# Patient Record
Sex: FEMALE | Race: WHITE | NOT HISPANIC OR LATINO | URBAN - METROPOLITAN AREA
[De-identification: names, ages, dates, MRNs, and addresses within clinical notes are randomized per-mention and may not be internally consistent; named-entity substitution may affect disease eponyms.]

---

## 2017-11-21 ENCOUNTER — APPOINTMENT (RX ONLY)
Dept: URBAN - METROPOLITAN AREA CLINIC 31 | Facility: CLINIC | Age: 37
Setting detail: DERMATOLOGY
End: 2017-11-21

## 2017-11-21 DIAGNOSIS — D18.0 HEMANGIOMA: ICD-10-CM

## 2017-11-21 DIAGNOSIS — D22 MELANOCYTIC NEVI: ICD-10-CM

## 2017-11-21 DIAGNOSIS — L81.1 CHLOASMA: ICD-10-CM

## 2017-11-21 DIAGNOSIS — L81.4 OTHER MELANIN HYPERPIGMENTATION: ICD-10-CM

## 2017-11-21 PROBLEM — D22.9 MELANOCYTIC NEVI, UNSPECIFIED: Status: ACTIVE | Noted: 2017-11-21

## 2017-11-21 PROBLEM — D18.01 HEMANGIOMA OF SKIN AND SUBCUTANEOUS TISSUE: Status: ACTIVE | Noted: 2017-11-21

## 2017-11-21 PROBLEM — D23.5 OTHER BENIGN NEOPLASM OF SKIN OF TRUNK: Status: ACTIVE | Noted: 2017-11-21

## 2017-11-21 PROBLEM — D23.72 OTHER BENIGN NEOPLASM OF SKIN OF LEFT LOWER LIMB, INCLUDING HIP: Status: ACTIVE | Noted: 2017-11-21

## 2017-11-21 PROBLEM — J45.909 UNSPECIFIED ASTHMA, UNCOMPLICATED: Status: ACTIVE | Noted: 2017-11-21

## 2017-11-21 PROBLEM — D22.5 MELANOCYTIC NEVI OF TRUNK: Status: ACTIVE | Noted: 2017-11-21

## 2017-11-21 PROBLEM — D22.61 MELANOCYTIC NEVI OF RIGHT UPPER LIMB, INCLUDING SHOULDER: Status: ACTIVE | Noted: 2017-11-21

## 2017-11-21 PROCEDURE — ? OBSERVATION AND MEASURE

## 2017-11-21 PROCEDURE — 99203 OFFICE O/P NEW LOW 30 MIN: CPT

## 2017-11-21 PROCEDURE — ? COUNSELING

## 2017-11-21 ASSESSMENT — LOCATION SIMPLE DESCRIPTION DERM
LOCATION SIMPLE: RIGHT THUMB
LOCATION SIMPLE: INFERIOR FOREHEAD
LOCATION SIMPLE: RIGHT BREAST
LOCATION SIMPLE: RIGHT UPPER BACK

## 2017-11-21 ASSESSMENT — LOCATION DETAILED DESCRIPTION DERM
LOCATION DETAILED: RIGHT MEDIAL UPPER BACK
LOCATION DETAILED: DORSAL INTERPHALANGEAL JOINT RIGHT THUMB
LOCATION DETAILED: INFERIOR MID FOREHEAD
LOCATION DETAILED: RIGHT MEDIAL BREAST 3-4:00 REGION

## 2017-11-21 ASSESSMENT — LOCATION ZONE DERM
LOCATION ZONE: FINGER
LOCATION ZONE: FACE
LOCATION ZONE: TRUNK

## 2017-11-21 NOTE — HPI: BUMPS
Have Your Bumps Been Treated?: not been treated
Is This A New Presentation, Or A Follow-Up?: Bumps
Additional History: They are not changing.

## 2017-11-21 NOTE — HPI: FULL BODY SKIN EXAMINATION
What Is The Reason For Today's Visit?: Full Body Skin Examination
What Is The Reason For Today's Visit? (Being Monitored For X): concerning skin lesions on an annual basis
Additional History: She has not noticed any tender or bleeding spots.

## 2019-10-09 ENCOUNTER — APPOINTMENT (RX ONLY)
Dept: URBAN - METROPOLITAN AREA CLINIC 31 | Facility: CLINIC | Age: 39
Setting detail: DERMATOLOGY
End: 2019-10-09

## 2019-10-09 DIAGNOSIS — L20.89 OTHER ATOPIC DERMATITIS: ICD-10-CM

## 2019-10-09 PROBLEM — L20.84 INTRINSIC (ALLERGIC) ECZEMA: Status: ACTIVE | Noted: 2019-10-09

## 2019-10-09 PROCEDURE — ? COUNSELING

## 2019-10-09 PROCEDURE — ? PRESCRIPTION

## 2019-10-09 PROCEDURE — ? TREATMENT REGIMEN

## 2019-10-09 PROCEDURE — 99213 OFFICE O/P EST LOW 20 MIN: CPT

## 2019-10-09 RX ORDER — BETAMETHASONE DIPROPIONATE 0.5 MG/G
OINTMENT TOPICAL BID
Qty: 1 | Refills: 3 | Status: ERX | COMMUNITY
Start: 2019-10-09

## 2019-10-09 RX ADMIN — BETAMETHASONE DIPROPIONATE: 0.5 OINTMENT TOPICAL at 17:43

## 2019-10-09 ASSESSMENT — LOCATION DETAILED DESCRIPTION DERM
LOCATION DETAILED: RIGHT PROXIMAL PRETIBIAL REGION
LOCATION DETAILED: RIGHT DISTAL PRETIBIAL REGION
LOCATION DETAILED: RIGHT DISTAL CALF

## 2019-10-09 ASSESSMENT — LOCATION SIMPLE DESCRIPTION DERM
LOCATION SIMPLE: RIGHT CALF
LOCATION SIMPLE: RIGHT PRETIBIAL REGION

## 2019-10-09 ASSESSMENT — LOCATION ZONE DERM: LOCATION ZONE: LEG

## 2019-10-09 NOTE — PROCEDURE: TREATMENT REGIMEN
Detail Level: Zone
Initiate Treatment: Betamethasone ointment BID with wet wraps at least daily x 2-3 weeks.\\nWet wrap instructions reviewed and handout given.\\nApply CeraVe Cream or VaniCream BID. \\nAvoid long, hot showers and use gentle cleanser or no soap on affected area when bathing. \\nF/u in 4 wks. Pt counseled on possible Bx at next visit if sx not resolving.

## 2019-11-18 ENCOUNTER — APPOINTMENT (RX ONLY)
Dept: URBAN - METROPOLITAN AREA CLINIC 31 | Facility: CLINIC | Age: 39
Setting detail: DERMATOLOGY
End: 2019-11-18

## 2019-11-18 DIAGNOSIS — L20.89 OTHER ATOPIC DERMATITIS: ICD-10-CM | Status: IMPROVED

## 2019-11-18 PROCEDURE — ? PRESCRIPTION

## 2019-11-18 PROCEDURE — ? COUNSELING

## 2019-11-18 PROCEDURE — ? TREATMENT REGIMEN

## 2019-11-18 PROCEDURE — 99213 OFFICE O/P EST LOW 20 MIN: CPT

## 2019-11-18 RX ORDER — CRISABOROLE 20 MG/G
OINTMENT TOPICAL BID
Qty: 1 | Refills: 4 | Status: ERX | COMMUNITY
Start: 2019-11-18

## 2019-11-18 RX ADMIN — CRISABOROLE: 20 OINTMENT TOPICAL at 00:00

## 2019-11-18 ASSESSMENT — LOCATION DETAILED DESCRIPTION DERM
LOCATION DETAILED: RIGHT DISTAL CALF
LOCATION DETAILED: RIGHT PROXIMAL PRETIBIAL REGION
LOCATION DETAILED: RIGHT DISTAL PRETIBIAL REGION

## 2019-11-18 ASSESSMENT — LOCATION ZONE DERM: LOCATION ZONE: LEG

## 2019-11-18 ASSESSMENT — LOCATION SIMPLE DESCRIPTION DERM
LOCATION SIMPLE: RIGHT CALF
LOCATION SIMPLE: RIGHT PRETIBIAL REGION

## 2019-11-18 NOTE — PROCEDURE: TREATMENT REGIMEN
Detail Level: Zone
Initiate Treatment: Eucrisa BID. Samples given.\\nPt may switch to betamethasone for up to 2 wks as needed in event of breakthrough flares. \\nDiscussed importance of consistent BID moisturizer use. Pt verbalizes understanding. \\nF/u in 6 mos. Sooner in event of any persistent/worsening sx.

## 2020-12-04 PROBLEM — U07.1 REAL TIME REVERSE TRANSCRIPTASE PCR POSITIVE FOR COVID-19 VIRUS: Status: ACTIVE | Noted: 2020-12-04

## 2021-09-25 ENCOUNTER — HOSPITAL ENCOUNTER (OUTPATIENT)
Dept: RADIOLOGY | Facility: MEDICAL CENTER | Age: 41
End: 2021-09-25

## 2021-09-25 PROBLEM — Z86.69 HX OF MIGRAINES: Status: ACTIVE | Noted: 2021-09-25

## 2021-09-25 PROBLEM — G08 ACUTE CEREBRAL VENOUS SINUS THROMBOSIS: Status: ACTIVE | Noted: 2021-09-25

## 2021-09-25 PROBLEM — R47.1 DYSARTHRIA: Status: ACTIVE | Noted: 2021-09-25

## 2021-12-02 NOTE — PROGRESS NOTES
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 She did have right sided headaches and dysarthria. (She is right handed).  She was discharged 9/29/21 on Xarelto and then changed to Eliquis. Protein S, C, Factor V Leiden and anticardiolipin abs were negative but drawn after anticoagulated.. Prothrombin 47881B and antithrombin III were not tested. She has a strong FH of strokes - father and aunt. She was on BC pills at the time of the thrombosis. Followed by neuro in [place]. Has chronic migraine and is on a multitude of migraine medications.    ROS:    Constitutional: No weight loss  Skin: No rash or jaundice  HENT: No change in eyesight or hearing  Cardiovascular:No chest pain or arrythmia  Respiratory:No cough or SOB  GI:No nausea, vomiting, diarrhea, constipation  :No dysuria or frequency  Musculoskeletal:No bone or joint pain  Neuro:See PI  Psych: No complaints. Does suffer from depression    PMH:      Allergies   Allergen Reactions   • Gluten Meal Unspecified     Gluten free diet    • Penicillin G Unspecified   • Sulfa Drugs    • Sulfamethoxazole-Trimethoprim Hives     Diffuse rash, fever, hepatitis, pharyngitis       Past Medical History:   Diagnosis Date   • Allergy    • Arthritis     mod c3-c5   • Asthma    • Back injuries 2000    L3-5 & S1 fracture    • Bronchitis    • Chronic sinusitis    • GERD (gastroesophageal reflux disease)    • Idiopathic angioedema     12 years ago   • Migraine    • Neck fracture (HCC) 2000    C1, C2   • Pneumonia    • Premature ovarian failure     premature ovarian failure   • SJS-TEN overlap syndrome ([facility])         Past Surgical History:   Procedure Laterality Date   • OTHER      gum graft surgery   • TENDON REPAIR Right     release of right  wrist   • TONSILLECTOMY     • TRIGGER FINGER RELEASE Right     thumb        Medications:    Current Outpatient Medications on File Prior to Visit   Medication Sig Dispense Refill   • Bacillus Coagulans-Inulin (PROBIOTIC FORMULA) 1-250 BILLION-MG Cap      • MULTIPLE VITAMINS-MINERALS ER PO Take  by mouth.     • Ascorbic Acid Crystals Take  by mouth.     • NS SOLN 60 mL with albuterol 2.5 mg/0.5 mL NEBU 10 mL      • vitamin D3 (CHOLECALCIFEROL) 5000 Unit (125 mcg) Tab Take 5,000 Units by mouth.     • ondansetron (ZOFRAN ODT) 4 MG TABLET DISPERSIBLE Take 4 mg by mouth every day.     • venlafaxine (EFFEXOR) 75 MG Tab Take 75 mg by mouth every day.     • Ubrogepant (UBRELVY) 100 MG Tab      • tizanidine (ZANAFLEX) 4 MG Tab Take 4 mg by mouth.     • promethazine (PHENERGAN) 25 MG Tab Take 25 mg by mouth.     • Tiotropium Bromide-Olodaterol (STIOLTO RESPIMAT) 2.5-2.5 MCG/ACT Aero Soln INHALE 2 PUFFS BY MOUTH EVERY 24 HOURS     • OXcarbazepine (TRILEPTAL) 300 MG Tab Take 300 mg by mouth.     • omeprazole (PRILOSEC) 40 MG delayed-release capsule 1 cap(s)     • clonazePAM (KLONOPIN) 0.5 MG Tab Take 0.25 mg by mouth.     • apixaban (ELIQUIS) 5mg Tab Take 1 Tablet by mouth 2 times a day.     • metoclopramide (REGLAN) 10 MG Tab Take 1 Tablet by mouth 2 times a day as needed (headaches and nausea). 60 Tablet 0   • Rimegepant Sulfate (NURTEC) 75 MG TABLET DISPERSIBLE Take 75 mg by mouth.     • ipratropium-albuterol (COMBIVENT RESPIMAT)  MCG/ACT Aero Soln Inhale 1 Puff 4 times a day as needed.     • Cholecalciferol (VITAMIN D3) 2000 UNIT Cap Take 5,000 Units by mouth every day.     • acetaminophen (TYLENOL) 500 MG Tab Take 1,000 mg by mouth every 6 hours as needed.     • Levocetirizine Dihydrochloride (XYZAL ALLERGY 24HR PO) Take 1 Tablet by mouth.     • Beclomethasone Diprop HFA (QVAR REDIHALER INH) Inhale.     • Cholecalciferol (VITAMIN D3) 2000 UNIT Cap 1 tab(s) (Patient not taking: Reported on 12/10/2021)     • Omega-3  Fatty Acids (FISH OIL) 1000 MG Cap capsule  (Patient not taking: Reported on 12/10/2021)     • Calcium Carb-Cholecalciferol (CALCIUM 1000 + D) 1000-800 MG-UNIT Tab  (Patient not taking: Reported on 12/10/2021)     • beclomethasone HFA (QVAR REDIHALER) 80 MCG/ACT inhaler Inhale 1 Puff 2 times a day. (Patient not taking: Reported on 12/10/2021)     • ipratropium-albuterol (COMBIVENT RESPIMAT)  MCG/ACT Aero Soln INHALE 2 PUFFS BY MOUTH EVERY 6 HOURS AS NEEDED *MAX 4 PUFFS IN 24 HOURS* (Patient not taking: Reported on 12/10/2021)     • Rimegepant Sulfate (NURTEC) 75 MG TABLET DISPERSIBLE Take 75 mg by mouth. (Patient not taking: Reported on 12/10/2021)     • omalizumab (XOLAIR) 150 MG as directed (Patient not taking: Reported on 12/10/2021)     • Fluticasone-Umeclidin-Vilant (TRELEGY ELLIPTA) 100-62.5-25 MCG/INH AEROSOL POWDER, BREATH ACTIVATED inhalation 1 puff(s) (Patient not taking: Reported on 12/10/2021)     • Azelastine-Fluticasone (DYMISTA) 137-50 MCG/ACT Suspension 1 spray(s) (Patient not taking: Reported on 12/10/2021)     • topiramate (TOPAMAX) 50 MG tablet Take 1 Tablet by mouth 2 times a day. (Patient not taking: Reported on 12/10/2021) 6 Tablet 0   • carBAMazepine (TEGRETOL) 200 MG Tab Take 1 Tablet by mouth 2 times a day. (Patient not taking: Reported on 12/10/2021) 60 Tablet 0   • rivaroxaban (XARELTO) 15 MG Tab tablet Take 1 Tablet by mouth 2 times a day. (Patient not taking: Reported on 12/10/2021) 42 Tablet 0   • pantoprazole (PROTONIX) 40 MG Tablet Delayed Response Take 1 Tablet by mouth every day. (Patient not taking: Reported on 12/10/2021) 30 Tablet 0   • diclofenac DR (VOLTAREN) 25 MG Tablet Delayed Response Take 25 mg by mouth 2 times a day. (Patient not taking: Reported on 12/10/2021)     • ELURYNG 0.12-0.015 MG/24HR vaginal ring Insert 1 Each into the vagina every 4 weeks. (Patient not taking: Reported on 12/10/2021)     • ondansetron (ZOFRAN ODT) 8 MG TABLET DISPERSIBLE Take 8 mg by  "mouth every 8 hours as needed for Nausea. (Patient not taking: Reported on 12/10/2021)     • pantoprazole (PROTONIX) 40 MG Tablet Delayed Response Take 40 mg by mouth every day. (Patient not taking: Reported on 12/10/2021)       No current facility-administered medications on file prior to visit.       Social History     Tobacco Use   • Smoking status: Never Smoker   • Smokeless tobacco: Never Used   Substance Use Topics   • Alcohol use: Yes     Comment: occasional        History reviewed. No pertinent family history.     Objective    Vitals:    /64   Pulse 82   Temp 36.2 °C (97.2 °F) (Temporal)   Resp 14   Ht 1.664 m (5' 5.5\")   Wt 72.6 kg (160 lb)   SpO2 97%   BMI 26.22 kg/m²     Physical Exam:    Appears well-developed and well-nourished. No distress.    Head -  Normocephalic .   Eyes - Pupils are equal.. Conjunctivae  normal. No scleral icterus.   Ears - normal hearing  Neurological -   Alert and oriented.  Skin -  No rash noted. Not diaphoretic. No erythema. No pallor. No jaundice   Psychiatric -  Normal mood and affect.    Labs:  Results for JOSE AGUIRRE (MRN [id number]) as of 12/2/2021 15:28   Ref. Range 9/28/2021 12:42   WBC Latest Ref Range: 4.8 - 10.8 K/uL 7.8   RBC Latest Ref Range: 4.20 - 5.40 M/uL 4.31   Hemoglobin Latest Ref Range: 13.0 - 17.0 g/dL 14.7   Hematocrit Latest Ref Range: 39.0 - 50.0 % 42.3   MCV Latest Ref Range: 81.0 - 99.0 fL 98.1   MCH Latest Ref Range: 27.0 - 31.0 pg 34.1 (H)   MCHC Latest Ref Range: 33.0 - 37.0 g/dL 34.8   RDW Latest Ref Range: 11.5 - 14.5 % 11.7   Platelet Count Latest Ref Range: 130 - 400 K/uL 272   Results for JOSE AGUIRRE (MRN [id number]) a   Ref. Range 9/27/2021 07:15   Protein C Activity Latest Ref Range: 70 - 180 % normal 121   Protein S Activity Latest Ref Range: 60 - 140 % normal 63   Factor V Leiden Mutation Unknown NEGATIVE   Results for JOSE AGUIRRE (MRN [id number])    Ref. Range 9/27/2021 07:15   Cardiolipin IgA Ab Latest Units: APL-U/mL 2.7 "   Cardiolipin IgG Ab Latest Units: GPL-U/mL <2.0   Cardiolipin IgM Ab Latest Units: MPL-U/mL <2.0       Assessment  Sounds like the thrombosis was provoked - vaccine + BC pills. Need to complete the thrombophilia w/u after 3 months of blood thinners. If negative can stay off Eliquis. If positive - chronic anticoagulation  Imp:    Visit Diagnosis:    1. Acute cerebral venous sinus thrombosis  FACTOR II DNA ANALYSIS    PROTEIN C PANEL    PROTEIN S FUNCTIONAL    ANTITHROMBIN PANEL (ARBILL)         Plan:    DC Eliquis after the first of the year and get the above lab 1 week later  Virtual visit a week after lab drawn    Brandon Orellana M.D.

## 2021-12-10 ENCOUNTER — OFFICE VISIT (OUTPATIENT)
Dept: HEMATOLOGY ONCOLOGY | Facility: MEDICAL CENTER | Age: 41
End: 2021-12-10
Payer: COMMERCIAL

## 2021-12-10 VITALS
OXYGEN SATURATION: 97 % | DIASTOLIC BLOOD PRESSURE: 64 MMHG | WEIGHT: 160 LBS | BODY MASS INDEX: 25.71 KG/M2 | RESPIRATION RATE: 14 BRPM | TEMPERATURE: 97.2 F | SYSTOLIC BLOOD PRESSURE: 118 MMHG | HEART RATE: 82 BPM | HEIGHT: 66 IN

## 2021-12-10 DIAGNOSIS — G08 ACUTE CEREBRAL VENOUS SINUS THROMBOSIS: ICD-10-CM

## 2021-12-10 PROCEDURE — 99204 OFFICE O/P NEW MOD 45 MIN: CPT | Performed by: INTERNAL MEDICINE

## 2021-12-10 RX ORDER — BACITRACIN ZINC AND POLYMYXIN B SULFATE 500; 10000 [USP'U]/G; [USP'U]/G
OINTMENT TOPICAL
COMMUNITY

## 2021-12-10 RX ORDER — IPRATROPIUM BROMIDE AND ALBUTEROL 20; 100 UG/1; UG/1
SPRAY, METERED RESPIRATORY (INHALATION)
COMMUNITY

## 2021-12-10 RX ORDER — TIOTROPIUM BROMIDE AND OLODATEROL 3.124; 2.736 UG/1; UG/1
SPRAY, METERED RESPIRATORY (INHALATION)
COMMUNITY

## 2021-12-10 RX ORDER — CLONAZEPAM 0.5 MG/1
0.25 TABLET ORAL
COMMUNITY
Start: 2021-11-16

## 2021-12-10 RX ORDER — PROMETHAZINE HYDROCHLORIDE 25 MG/1
25 TABLET ORAL
COMMUNITY
Start: 2021-09-30

## 2021-12-10 RX ORDER — RIMEGEPANT SULFATE 75 MG/75MG
75 TABLET, ORALLY DISINTEGRATING ORAL
COMMUNITY
Start: 2021-10-19

## 2021-12-10 RX ORDER — ONDANSETRON 4 MG/1
4 TABLET, ORALLY DISINTEGRATING ORAL DAILY
COMMUNITY
Start: 2021-12-08 | End: 2021-12-22

## 2021-12-10 RX ORDER — BECLOMETHASONE DIPROPIONATE HFA 80 UG/1
1 AEROSOL, METERED RESPIRATORY (INHALATION) 2 TIMES DAILY
COMMUNITY

## 2021-12-10 RX ORDER — VENLAFAXINE 75 MG/1
100 TABLET ORAL DAILY
COMMUNITY
Start: 2021-11-16

## 2021-12-10 RX ORDER — ACETAMINOPHEN 160 MG
TABLET,DISINTEGRATING ORAL
COMMUNITY
End: 2022-06-25

## 2021-12-10 RX ORDER — UBROGEPANT 100 MG/1
TABLET ORAL
COMMUNITY
Start: 2021-09-24

## 2021-12-10 RX ORDER — OMALIZUMAB 202.5 MG/1.4ML
INJECTION, SOLUTION SUBCUTANEOUS
COMMUNITY
End: 2022-06-25

## 2021-12-10 RX ORDER — OMEPRAZOLE 40 MG/1
CAPSULE, DELAYED RELEASE ORAL
COMMUNITY

## 2021-12-10 RX ORDER — OXCARBAZEPINE 300 MG/1
300 TABLET, FILM COATED ORAL
COMMUNITY
Start: 2021-10-05 | End: 2022-06-25

## 2021-12-10 RX ORDER — TIZANIDINE 4 MG/1
4 TABLET ORAL
COMMUNITY
Start: 2021-09-30 | End: 2022-06-25

## 2021-12-10 RX ORDER — AZELASTINE HYDROCHLORIDE, FLUTICASONE PROPIONATE 137; 50 UG/1; UG/1
SPRAY, METERED NASAL
COMMUNITY
End: 2022-06-25

## 2021-12-10 RX ORDER — CHLORAL HYDRATE 500 MG
CAPSULE ORAL
COMMUNITY
End: 2022-06-25

## 2021-12-10 ASSESSMENT — PATIENT HEALTH QUESTIONNAIRE - PHQ9
SUM OF ALL RESPONSES TO PHQ QUESTIONS 1-9: 11
5. POOR APPETITE OR OVEREATING: 3 - NEARLY EVERY DAY
CLINICAL INTERPRETATION OF PHQ2 SCORE: 2

## 2021-12-10 ASSESSMENT — FIBROSIS 4 INDEX: FIB4 SCORE: 0.65

## 2022-01-12 NOTE — PROGRESS NOTES
This evaluation was conducted via Zoom using secure and encrypted videoconferencing technology. The patient was in a private location in the state of [place].    The patient's identity was confirmed and verbal consent was obtained for this virtual visit.    01/18/22    Subjective    Chief Complaint:  Follow up from consult on 12/20/21 for right transverse sinus thrombosis.     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 Factor V Leiden is negative. Prothrombin gene mutation was [place] [facility] [place] did a Factor II level which was normal. Protein C activity was low at 30 - it was normal last September while on anticoagulants. Very srong FH of clots. Father had a stroke, aunt and uncle both had clotting issues - strokes and PE's.     ROS:    Constitutional: No weight loss  Skin: No rash or jaundice  HENT: No change in eyesight or hearing  Cardiovascular:No chest pain or arrythmia  Respiratory:No cough or SOB  GI:No nausea, vomiting, diarrhea, constipation  :No dysuria or frequency  Musculoskeletal:No bone or joint pain  Neuro:No sx's of neuropathy  Psych: No complaints    PMH:      Allergies   Allergen Reactions   • Gluten Meal Unspecified     Gluten free diet    • Penicillin G Unspecified   • Sulfa Drugs    • Sulfamethoxazole-Trimethoprim Hives     Diffuse rash, fever, hepatitis, pharyngitis       Past Medical History:   Diagnosis Date   • Allergy    • Arthritis     mod c3-c5   • Asthma    • Back injuries 2000    L3-5 & S1 fracture    • Bronchitis    • Chronic sinusitis    • GERD (gastroesophageal reflux disease)    • Idiopathic angioedema     12 years ago   • Migraine    • Neck fracture (HCC) 2000    C1, C2   • Pneumonia    • Premature ovarian failure     premature ovarian failure   • SJS-TEN overlap syndrome ([facility])         Past Surgical History:   Procedure Laterality Date   • OTHER      gum graft surgery   • TENDON REPAIR Right      release of right wrist   • TONSILLECTOMY     • TRIGGER FINGER RELEASE Right     thumb        Medications:    Current Outpatient Medications on File Prior to Visit   Medication Sig Dispense Refill   • Cholecalciferol (VITAMIN D3) 2000 UNIT Cap 1 tab(s)     • Bacillus Coagulans-Inulin (PROBIOTIC FORMULA) 1-250 BILLION-MG Cap      • MULTIPLE VITAMINS-MINERALS ER PO Take  by mouth.     • Omega-3 Fatty Acids (FISH OIL) 1000 MG Cap capsule      • Calcium Carb-Cholecalciferol (CALCIUM 1000 + D) 1000-800 MG-UNIT Tab      • Ascorbic Acid Crystals Take  by mouth.     • NS SOLN 60 mL with albuterol 2.5 mg/0.5 mL NEBU 10 mL      • beclomethasone HFA (QVAR REDIHALER) 80 MCG/ACT inhaler Inhale 1 Puff 2 times a day.     • vitamin D3 (CHOLECALCIFEROL) 5000 Unit (125 mcg) Tab Take 5,000 Units by mouth.     • ipratropium-albuterol (COMBIVENT RESPIMAT)  MCG/ACT Aero Soln INHALE 2 PUFFS BY MOUTH EVERY 6 HOURS AS NEEDED *MAX 4 PUFFS IN 24 HOURS*     • Rimegepant Sulfate (NURTEC) 75 MG TABLET DISPERSIBLE Take 75 mg by mouth.     • venlafaxine (EFFEXOR) 75 MG Tab Take 75 mg by mouth every day.     • Ubrogepant (UBRELVY) 100 MG Tab      • tizanidine (ZANAFLEX) 4 MG Tab Take 4 mg by mouth.     • promethazine (PHENERGAN) 25 MG Tab Take 25 mg by mouth.     • Tiotropium Bromide-Olodaterol (STIOLTO RESPIMAT) 2.5-2.5 MCG/ACT Aero Soln INHALE 2 PUFFS BY MOUTH EVERY 24 HOURS     • OXcarbazepine (TRILEPTAL) 300 MG Tab Take 300 mg by mouth.     • omeprazole (PRILOSEC) 40 MG delayed-release capsule 1 cap(s)     • omalizumab (XOLAIR) 150 MG as directed     • Fluticasone-Umeclidin-Vilant (TRELEGY ELLIPTA) 100-62.5-25 MCG/INH AEROSOL POWDER, BREATH ACTIVATED inhalation 1 puff(s)     • clonazePAM (KLONOPIN) 0.5 MG Tab Take 0.25 mg by mouth.     • Azelastine-Fluticasone (DYMISTA) 137-50 MCG/ACT Suspension 1 spray(s)     • apixaban (ELIQUIS) 5mg Tab Take 1 Tablet by mouth 2 times a day.     • metoclopramide (REGLAN) 10 MG Tab Take 1 Tablet by mouth 2  "times a day as needed (headaches and nausea). 60 Tablet 0   • topiramate (TOPAMAX) 50 MG tablet Take 1 Tablet by mouth 2 times a day. 6 Tablet 0   • carBAMazepine (TEGRETOL) 200 MG Tab Take 1 Tablet by mouth 2 times a day. 60 Tablet 0   • rivaroxaban (XARELTO) 15 MG Tab tablet Take 1 Tablet by mouth 2 times a day. 42 Tablet 0   • pantoprazole (PROTONIX) 40 MG Tablet Delayed Response Take 1 Tablet by mouth every day. 30 Tablet 0   • Rimegepant Sulfate (NURTEC) 75 MG TABLET DISPERSIBLE Take 75 mg by mouth.     • diclofenac DR (VOLTAREN) 25 MG Tablet Delayed Response Take 25 mg by mouth 2 times a day.     • ipratropium-albuterol (COMBIVENT RESPIMAT)  MCG/ACT Aero Soln Inhale 1 Puff 4 times a day as needed.     • ELURYNG 0.12-0.015 MG/24HR vaginal ring Insert 1 Each into the vagina every 4 weeks.     • Cholecalciferol (VITAMIN D3) 2000 UNIT Cap Take 5,000 Units by mouth every day.     • acetaminophen (TYLENOL) 500 MG Tab Take 1,000 mg by mouth every 6 hours as needed.     • ondansetron (ZOFRAN ODT) 8 MG TABLET DISPERSIBLE Take 8 mg by mouth every 8 hours as needed for Nausea.     • pantoprazole (PROTONIX) 40 MG Tablet Delayed Response Take 40 mg by mouth every day.     • Levocetirizine Dihydrochloride (XYZAL ALLERGY 24HR PO) Take 1 Tablet by mouth.     • Beclomethasone Diprop HFA (QVAR REDIHALER INH) Inhale.       No current facility-administered medications on file prior to visit.       Social History     Tobacco Use   • Smoking status: Never Smoker   • Smokeless tobacco: Never Used   Substance Use Topics   • Alcohol use: Yes     Comment: occasional        History reviewed. No pertinent family history.     Objective    Vitals:    Ht 1.664 m (5' 5.51\") Comment: PER PT  Wt 72.6 kg (160 lb 0.9 oz) Comment: PER PT  BMI 26.22 kg/m²     Physical Exam:    Appears well-developed and well-nourished. No distress.    Head -  Normocephalic .   Eyes - Pupils are equal. Conjunctivae normal. No scleral icterus.   Ears - " normal hearing.    Neurological -   Alert and oriented.  Skin -  No rash noted. Not diaphoretic. No erythema. No pallor. No jaundice   Psychiatric -  Normal mood and affect.    Labs:    Results for JOSE AGUIRRE (MRN [id number])    Ref. Range 9/27/2021 07:15   Protein C Activity Latest Ref Range: 70 - 180 % normal 121   Protein S Activity Latest Ref Range: 60 - 140 % normal 63   Factor V Leiden Mutation Unknown NEGATIVE     Assessment    Imp:    Visit Diagnosis:    1. Acute cerebral venous sinus thrombosis  FACTOR II DNA ANALYSIS   2. Protein C deficiency (HCC)       Go back on Eliquis chronically  Re-test for the correct Prothrombin gene mutation  Siblings should be tested as well  See me prn  Plan:      Brandon Orellana M.D.

## 2022-01-18 ENCOUNTER — TELEMEDICINE (OUTPATIENT)
Dept: HEMATOLOGY ONCOLOGY | Facility: MEDICAL CENTER | Age: 42
End: 2022-01-18
Payer: COMMERCIAL

## 2022-01-18 VITALS — BODY MASS INDEX: 25.72 KG/M2 | HEIGHT: 66 IN | WEIGHT: 160.05 LBS

## 2022-01-18 DIAGNOSIS — D68.59 PROTEIN C DEFICIENCY (HCC): ICD-10-CM

## 2022-01-18 DIAGNOSIS — G08 ACUTE CEREBRAL VENOUS SINUS THROMBOSIS: ICD-10-CM

## 2022-01-18 PROCEDURE — 99214 OFFICE O/P EST MOD 30 MIN: CPT | Mod: 95 | Performed by: INTERNAL MEDICINE

## 2022-01-18 ASSESSMENT — FIBROSIS 4 INDEX: FIB4 SCORE: 0.65
